# Patient Record
Sex: FEMALE | Race: BLACK OR AFRICAN AMERICAN | NOT HISPANIC OR LATINO
[De-identification: names, ages, dates, MRNs, and addresses within clinical notes are randomized per-mention and may not be internally consistent; named-entity substitution may affect disease eponyms.]

---

## 2022-06-13 ENCOUNTER — APPOINTMENT (OUTPATIENT)
Dept: ORTHOPEDIC SURGERY | Facility: CLINIC | Age: 67
End: 2022-06-13
Payer: COMMERCIAL

## 2022-06-13 VITALS — BODY MASS INDEX: 25.33 KG/M2 | WEIGHT: 152 LBS | HEIGHT: 65 IN

## 2022-06-13 PROCEDURE — 99203 OFFICE O/P NEW LOW 30 MIN: CPT

## 2022-06-13 NOTE — ASSESSMENT
[FreeTextEntry1] : I DISCUSSED TREATMENT OPTIONS WHICH IN MY OPINION NEEDS TO BE REMOVED SURGICALLY.\par I WILL ORDER AN MRI TO EVALUATE PREOPERATIVELY TO PLAN FOR SURGERY.

## 2022-06-13 NOTE — PHYSICAL EXAM
[Bilateral] : foot and ankle bilaterally [NL (20)] : eversion 20 degrees [NL (40)] : MTP joint DF 40 degrees [5___] : [facility] 5[unfilled]/5 [2+] : posterior tibialis pulse: 2+ [] : varicosities are not warm and well perfused [FreeTextEntry3] : FIBROMAS NOTED MEDIAL PLANTAR FASCIA RIGHT FOOT AND MIDDLE BAND LEFT FOOT.  PAIN IN RIGHT ONLY.   [de-identified] : PAIN ALONG PLANTAR FASCIITIS.\par

## 2022-06-13 NOTE — REASON FOR VISIT
[FreeTextEntry2] : Patient is here for second evaluation for her bilateral feet plantar fibroma. Patient was seen by Dr Aquino who tried injections to shrink it but it seems it did not work so she is here for second opinion. STEROID INJECTIONS OVER THE LAST THREE YEARS HAVE NOT DONE ANYTHING.  STEROID INJECTION EVERY TWO MONTHS.

## 2022-06-14 ENCOUNTER — TRANSCRIPTION ENCOUNTER (OUTPATIENT)
Age: 67
End: 2022-06-14

## 2022-07-12 ENCOUNTER — APPOINTMENT (OUTPATIENT)
Dept: PODIATRY | Facility: CLINIC | Age: 67
End: 2022-07-12

## 2022-07-12 PROCEDURE — 99214 OFFICE O/P EST MOD 30 MIN: CPT

## 2022-07-12 NOTE — PHYSICAL EXAM
[Right] : right foot and ankle [NL (40)] : plantar flexion 40 degrees [NL 30)] : inversion 30 degrees [NL (20)] : eversion 20 degrees [5___] : [facility] 5[unfilled]/5 [2+] : posterior tibialis pulse: 2+ [] : varicosities are not warm and well perfused [FreeTextEntry3] : Plantar fibromas right foot -multiple type according to MRI

## 2022-07-12 NOTE — DATA REVIEWED
[MRI] : MRI [Right] : of the right [Foot] : foot [Report was reviewed and noted in the chart] : The report was reviewed and noted in the chart [I reviewed the films/CD and agree] : I reviewed the films/CD and agree [FreeTextEntry1] : EXAM: MRI FOOT RT WO CONT\par \par CLINICAL HISTORY: 66 years old. PLANTAR FASCIAL FIBROMATOSIS OF BOTH FEET;\par SMR.\par \par TECHNIQUE: Multiplanar, multisequence MR images of the right forefoot and\par midfoot was performed without intravenous contrast.\par \par Technologist Comments: Fibromatosis on plantar aspect of Right foot, marked\par with Vitamin E tab..\par \par Contrast: None\par \par COMPARISON: No relevant prior exam.\par \par FINDINGS:\par \par Bones: No acute osseous abnormality. Small subchondral cyst at the lateral\par aspect of the cuboid at the cuboid-5th metatarsal joint representing\par degenerative change.\par \par Fluid/effusion: No sizable joint effusion. There is a ganglion cyst along\par the dorsal aspect of the talar neck measuring approximately 1.8 x 2.1 x 1.8\par cm in size, possibly sequela of prior extensor retinaculum sprain injury.\par \par Flexor tendons: Intact.\par \par Extensor tendons: Intact.\par \par Achilles tendon: Intact. Trace fluid within the retrocalcaneal bursa.\par \par Sinus tarsi: Unremarkable.\par \par Muscles: No selective muscle edema or disproportionate atrophy. No acute\par muscle injury.\par \par Soft tissues: There is a large soft tissue mass measuring 1.7 x 0.8 x 5.9\par cm in size associated with the plantar fascia spanning from the mid foot 2\par mid metatarsal shaft (series 7 image 17-21), consistent with a large plantar\par fibroma.\par \par Lisfranc ligament: Intact.\par \par Other: None.\par \par \par IMPRESSION:\par \par Large plantar fibroma measuring 1.7 x 0.8 x 5.9 cm.\par \par A ganglion cyst along the dorsal aspect of the talar neck measuring\par approximately 1.8 x 2.1 x 1.8 cm in size, possibly sequela of prior extensor\par retinaculum sprain injury.\par \par Mild degenerative arthrosis of the cuboid-fifth metatarsal joint.\par EXAM: MRI FOOT RT WO CONT\par \par CLINICAL HISTORY: 66 years old. PLANTAR FASCIAL FIBROMATOSIS OF BOTH FEET;\par SMR.\par \par TECHNIQUE: Multiplanar, multisequence MR images of the right forefoot and\par midfoot was performed without intravenous contrast.\par \par Technologist Comments: Fibromatosis on plantar aspect of Right foot, marked\par with Vitamin E tab..\par \par Contrast: None\par \par COMPARISON: No relevant prior exam.\par \par FINDINGS:\par \par Bones: No acute osseous abnormality. Small subchondral cyst at the lateral\par aspect of the cuboid at the cuboid-5th metatarsal joint representing\par degenerative change.\par \par Fluid/effusion: No sizable joint effusion. There is a ganglion cyst along\par the dorsal aspect of the talar neck measuring approximately 1.8 x 2.1 x 1.8\par cm in size, possibly sequela of prior extensor retinaculum sprain injury.\par \par Flexor tendons: Intact.\par \par Extensor tendons: Intact.\par \par Achilles tendon: Intact. Trace fluid within the retrocalcaneal bursa.\par \par Sinus tarsi: Unremarkable.\par \par Muscles: No selective muscle edema or disproportionate atrophy. No acute\par muscle injury.\par \par Soft tissues: There is a large soft tissue mass measuring 1.7 x 0.8 x 5.9\par cm in size associated with the plantar fascia spanning from the mid foot 2\par mid metatarsal shaft (series 7 image 17-21), consistent with a large plantar\par fibroma.\par \par Lisfranc ligament: Intact.\par \par Other: None.\par \par \par IMPRESSION:\par \par Large plantar fibroma measuring 1.7 x 0.8 x 5.9 cm.\par \par A ganglion cyst along the dorsal aspect of the talar neck measuring\par approximately 1.8 x 2.1 x 1.8 cm in size, possibly sequela of prior extensor\par retinaculum sprain injury.\par \par Mild degenerative arthrosis of the cuboid-fifth metatarsal joint.\par EXAM: MRI FOOT RT WO CONT\par \par CLINICAL HISTORY: 66 years old. PLANTAR FASCIAL FIBROMATOSIS OF BOTH FEET;\par SMR.\par \par TECHNIQUE: Multiplanar, multisequence MR images of the right forefoot and\par midfoot was performed without intravenous contrast.\par \par Technologist Comments: Fibromatosis on plantar aspect of Right foot, marked\par with Vitamin E tab..\par \par Contrast: None\par \par COMPARISON: No relevant prior exam.\par \par FINDINGS:\par \par Bones: No acute osseous abnormality. Small subchondral cyst at the lateral\par aspect of the cuboid at the cuboid-5th metatarsal joint representing\par degenerative change.\par \par Fluid/effusion: No sizable joint effusion. There is a ganglion cyst along\par the dorsal aspect of the talar neck measuring approximately 1.8 x 2.1 x 1.8\par cm in size, possibly sequela of prior extensor retinaculum sprain injury.\par \par Flexor tendons: Intact.\par \par Extensor tendons: Intact.\par \par Achilles tendon: Intact. Trace fluid within the retrocalcaneal bursa.\par \par Sinus tarsi: Unremarkable.\par \par Muscles: No selective muscle edema or disproportionate atrophy. No acute\par muscle injury.\par \par Soft tissues: There is a large soft tissue mass measuring 1.7 x 0.8 x 5.9\par cm in size associated with the plantar fascia spanning from the mid foot 2\par mid metatarsal shaft (series 7 image 17-21), consistent with a large plantar\par fibroma.\par \par Lisfranc ligament: Intact.\par \par Other: None.\par \par \par IMPRESSION:\par \par Large plantar fibroma measuring 1.7 x 0.8 x 5.9 cm.\par \par A ganglion cyst along the dorsal aspect of the talar neck measuring\par approximately 1.8 x 2.1 x 1.8 cm in size, possibly sequela of prior extensor\par retinaculum sprain injury.\par \par Mild degenerative arthrosis of the cuboid-fifth metatarsal joint.\par

## 2022-07-12 NOTE — ASSESSMENT
[FreeTextEntry1] : i will schedule her for a removal of the fibromas on October 13th 2022 at [facility].  Outpatient.\par Allergy-sulfa.\par No blood thiners\par No smoke\par No guarantee as to a result or cure. \par

## 2022-07-12 NOTE — REASON FOR VISIT
[FreeTextEntry2] : Patient is here for second evaluation for her bilateral feet FOLLOW UP MRI RESULTS

## 2022-07-20 ENCOUNTER — FORM ENCOUNTER (OUTPATIENT)
Age: 67
End: 2022-07-20

## 2022-07-25 ENCOUNTER — FORM ENCOUNTER (OUTPATIENT)
Age: 67
End: 2022-07-25

## 2022-09-16 ENCOUNTER — APPOINTMENT (OUTPATIENT)
Dept: PODIATRY | Facility: CLINIC | Age: 67
End: 2022-09-16

## 2022-09-16 PROCEDURE — L3260: CPT | Mod: RT

## 2022-09-16 PROCEDURE — 99213 OFFICE O/P EST LOW 20 MIN: CPT

## 2022-09-21 ENCOUNTER — NON-APPOINTMENT (OUTPATIENT)
Age: 67
End: 2022-09-21

## 2022-09-21 ENCOUNTER — APPOINTMENT (OUTPATIENT)
Dept: CARDIOLOGY | Facility: CLINIC | Age: 67
End: 2022-09-21

## 2022-09-21 VITALS
OXYGEN SATURATION: 98 % | HEART RATE: 71 BPM | BODY MASS INDEX: 26.46 KG/M2 | WEIGHT: 155 LBS | SYSTOLIC BLOOD PRESSURE: 120 MMHG | DIASTOLIC BLOOD PRESSURE: 72 MMHG | HEIGHT: 64 IN

## 2022-09-21 VITALS — SYSTOLIC BLOOD PRESSURE: 118 MMHG | DIASTOLIC BLOOD PRESSURE: 78 MMHG

## 2022-09-21 DIAGNOSIS — Z78.9 OTHER SPECIFIED HEALTH STATUS: ICD-10-CM

## 2022-09-21 DIAGNOSIS — Z82.79 FAMILY HISTORY OF OTHER CONGENITAL MALFORMATIONS, DEFORMATIONS AND CHROMOSOMAL ABNORMALITIES: ICD-10-CM

## 2022-09-21 DIAGNOSIS — Z13.6 ENCOUNTER FOR SCREENING FOR CARDIOVASCULAR DISORDERS: ICD-10-CM

## 2022-09-21 DIAGNOSIS — Z83.3 FAMILY HISTORY OF DIABETES MELLITUS: ICD-10-CM

## 2022-09-21 DIAGNOSIS — Z87.891 PERSONAL HISTORY OF NICOTINE DEPENDENCE: ICD-10-CM

## 2022-09-21 DIAGNOSIS — Z00.00 ENCOUNTER FOR GENERAL ADULT MEDICAL EXAMINATION W/OUT ABNORMAL FINDINGS: ICD-10-CM

## 2022-09-21 PROCEDURE — 99204 OFFICE O/P NEW MOD 45 MIN: CPT | Mod: 25

## 2022-09-21 PROCEDURE — 93000 ELECTROCARDIOGRAM COMPLETE: CPT | Mod: 59

## 2022-09-21 PROCEDURE — 93242 EXT ECG>48HR<7D RECORDING: CPT | Mod: 59

## 2022-09-21 NOTE — PHYSICAL EXAM
[Well Developed] : well developed [Well Nourished] : well nourished [No Acute Distress] : no acute distress [Normal Venous Pressure] : normal venous pressure [No Carotid Bruit] : no carotid bruit [Normal S1, S2] : normal S1, S2 [No Rub] : no rub [No Gallop] : no gallop [Clear Lung Fields] : clear lung fields [Good Air Entry] : good air entry [No Respiratory Distress] : no respiratory distress  [Normal Gait] : normal gait [No Edema] : no edema [No Cyanosis] : no cyanosis [No Clubbing] : no clubbing [No Varicosities] : no varicosities [Normal Radial B/L] : normal radial B/L [Normal DP B/L] : normal DP B/L [Moves all extremities] : moves all extremities [Normal Speech] : normal speech [Alert and Oriented] : alert and oriented [de-identified] : Midsystolic murmur

## 2022-09-21 NOTE — ASSESSMENT
[FreeTextEntry1] : Reviewed on September 21, 2022\par EKG as noted above\par Labs from September 8, 2022 sodium 142 potassium 4.4 creatinine 0.44 CBC was stable.  TSH was 1.8

## 2022-09-21 NOTE — REASON FOR VISIT
[Symptom and Test Evaluation] : symptom and test evaluation [Arrhythmia/ECG Abnorrmalities] : arrhythmia/ECG abnormalities [FreeTextEntry3] : Dr. Ruiz [FreeTextEntry1] : 67-year-old female is referred to me for consultation for evaluation for cardiovascular risk in presence of her age and irregularity of the pulse.\par She has no complaint of chest pain at present.  No significant shortness of breath PND orthopnea palpitation dizziness lightheadedness near syncopal or syncopal event.\par No claudication pain\par No history of hypertension, diabetes mellitus, myocardial infarction, peripheral artery disease, rheumatic fever, thyroid or Lyme disease.\par She is non-smoker.  No significant alcohol intake.  No increased caffeine intake

## 2022-09-30 ENCOUNTER — APPOINTMENT (OUTPATIENT)
Dept: CARDIOLOGY | Facility: CLINIC | Age: 67
End: 2022-09-30

## 2022-09-30 PROCEDURE — 93244 EXT ECG>48HR<7D REV&INTERPJ: CPT

## 2022-10-05 ENCOUNTER — APPOINTMENT (OUTPATIENT)
Dept: CARDIOLOGY | Facility: CLINIC | Age: 67
End: 2022-10-05

## 2022-10-05 VITALS
TEMPERATURE: 97.8 F | SYSTOLIC BLOOD PRESSURE: 104 MMHG | OXYGEN SATURATION: 98 % | BODY MASS INDEX: 26.43 KG/M2 | HEART RATE: 77 BPM | DIASTOLIC BLOOD PRESSURE: 70 MMHG | WEIGHT: 154 LBS

## 2022-10-05 DIAGNOSIS — R94.31 ABNORMAL ELECTROCARDIOGRAM [ECG] [EKG]: ICD-10-CM

## 2022-10-05 DIAGNOSIS — I49.1 ATRIAL PREMATURE DEPOLARIZATION: ICD-10-CM

## 2022-10-05 PROCEDURE — 93306 TTE W/DOPPLER COMPLETE: CPT

## 2022-10-05 PROCEDURE — 99214 OFFICE O/P EST MOD 30 MIN: CPT

## 2022-10-05 NOTE — DISCUSSION/SUMMARY
[FreeTextEntry1] : DILIA KAYE  is a 67 year F  who presents today Oct 05, 2022 with the above history and the following active issues. \par \par PAC's. NEGRO reviewed and burder is 3.3%. Patient is asymptomatic from the extra beats. Recommend she continue to monitor. Avoid excessive amounts of caffeine. Continue to remain active. Stay well hydrated. \par \par Murmur - echo reviewed. Normal LVEF. Minimal valulopathy. Does not require SBE prophylaxis. \par \par Preventive care.  Fasting lipid panel requested. Low cholesterol diet. Lifestyle and risk factor modification.\par \par Stable from cardiac point of view for her foot procedure.\par \par Red flag symptoms which would warrant sooner emergent evaluation reviewed with the patient. \par Questions and concerns were addressed and answered. \par \par Sincerely,\par \par Ruth Puckett PA-C\par Patients history, testing and plan reviewed with supervising MD: Dr. Tamika Ruiz

## 2022-10-05 NOTE — PHYSICAL EXAM
[Well Developed] : well developed [Well Nourished] : well nourished [No Acute Distress] : no acute distress [Normal Venous Pressure] : normal venous pressure [No Carotid Bruit] : no carotid bruit [Normal S1, S2] : normal S1, S2 [No Rub] : no rub [No Gallop] : no gallop [Clear Lung Fields] : clear lung fields [Good Air Entry] : good air entry [No Respiratory Distress] : no respiratory distress  [Normal Gait] : normal gait [No Edema] : no edema [No Cyanosis] : no cyanosis [No Clubbing] : no clubbing [No Varicosities] : no varicosities [Normal Radial B/L] : normal radial B/L [Normal DP B/L] : normal DP B/L [Moves all extremities] : moves all extremities [Normal Speech] : normal speech [Alert and Oriented] : alert and oriented [de-identified] : Midsystolic murmur

## 2022-10-05 NOTE — REASON FOR VISIT
[Symptom and Test Evaluation] : symptom and test evaluation [Arrhythmia/ECG Abnorrmalities] : arrhythmia/ECG abnormalities [FreeTextEntry3] : Dr. Ruiz [FreeTextEntry1] : DILIA KAYE  is a 67 year F  who presents today Oct 05, 2022 for echo and to review testing.  Overall she has been feeling well. There has been no recent illness or hospital stay. Medications have remained unchanged. Asymptomatic from cardiovascular and arrhythmia standpoint. \par She is active with no new exertional complaints. \par \par Today she denies chest pain, pressure, unusual shortness of breath, lightheadedness, dizziness, near syncope or syncope. \par \par \par 67-year-old female is referred to me for consultation for evaluation for cardiovascular risk in presence of her age and irregularity of the pulse.\par No history of hypertension, diabetes mellitus, myocardial infarction, peripheral artery disease, rheumatic fever, thyroid or Lyme disease.\par She is non-smoker.  No significant alcohol intake.  No increased caffeine intake

## 2022-10-05 NOTE — CARDIOLOGY SUMMARY
[de-identified] : September 21, 2022.  Normal sinus rhythm frequent PACs [de-identified] : BIRDIE TOM 10/21/22 - SR with avg HR in 80's with rare ectopy [de-identified] : Echo 10/5/2022 - EF 60-65%, min MR

## 2022-10-10 ENCOUNTER — FORM ENCOUNTER (OUTPATIENT)
Age: 67
End: 2022-10-10

## 2022-10-11 RX ORDER — KETOROLAC TROMETHAMINE 10 MG/1
10 TABLET, FILM COATED ORAL EVERY 6 HOURS
Qty: 20 | Refills: 0 | Status: ACTIVE | COMMUNITY
Start: 2022-10-11 | End: 1900-01-01

## 2022-10-11 RX ORDER — HYDROCODONE BITARTRATE AND ACETAMINOPHEN 5; 325 MG/1; MG/1
5-325 TABLET ORAL
Qty: 42 | Refills: 0 | Status: ACTIVE | COMMUNITY
Start: 2022-10-11 | End: 1900-01-01

## 2022-10-13 ENCOUNTER — FORM ENCOUNTER (OUTPATIENT)
Age: 67
End: 2022-10-13

## 2022-10-13 ENCOUNTER — APPOINTMENT (OUTPATIENT)
Dept: ORTHOPEDIC SURGERY | Facility: AMBULATORY MEDICAL SERVICES | Age: 67
End: 2022-10-13

## 2022-10-13 PROCEDURE — 28060 PARTIAL REMOVAL FOOT FASCIA: CPT | Mod: RT

## 2022-10-16 ENCOUNTER — FORM ENCOUNTER (OUTPATIENT)
Age: 67
End: 2022-10-16

## 2022-10-24 ENCOUNTER — APPOINTMENT (OUTPATIENT)
Dept: PODIATRY | Facility: CLINIC | Age: 67
End: 2022-10-24

## 2022-10-24 PROCEDURE — 99024 POSTOP FOLLOW-UP VISIT: CPT

## 2022-10-24 NOTE — ASSESSMENT
[FreeTextEntry1] : [employer] APPLIED\par PATIENT CAN REMOVE DRESSINGS ON THURSDAY AND [employer]HE FOOT.  SHE WILL APPLY BANDAGE AND CONTINUE WITH CRUTCHES. \par RTO 2 WEEKS

## 2022-11-07 ENCOUNTER — APPOINTMENT (OUTPATIENT)
Dept: PODIATRY | Facility: CLINIC | Age: 67
End: 2022-11-07

## 2022-11-07 PROCEDURE — 99024 POSTOP FOLLOW-UP VISIT: CPT

## 2022-11-07 NOTE — ASSESSMENT
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

## 2022-12-05 ENCOUNTER — APPOINTMENT (OUTPATIENT)
Dept: ORTHOPEDIC SURGERY | Facility: CLINIC | Age: 67
End: 2022-12-05

## 2022-12-05 DIAGNOSIS — M72.2 PLANTAR FASCIAL FIBROMATOSIS: ICD-10-CM

## 2022-12-05 PROCEDURE — 99024 POSTOP FOLLOW-UP VISIT: CPT

## 2022-12-05 NOTE — REASON FOR VISIT
[FreeTextEntry2] : Patient is presents for right foot follow up (sx 10/13/2022) states she has some discomfort but is taking aspirin as needed and presents in post op shoe

## 2023-01-31 ENCOUNTER — OFFICE (OUTPATIENT)
Dept: URBAN - METROPOLITAN AREA CLINIC 8 | Facility: CLINIC | Age: 68
Setting detail: OPHTHALMOLOGY
End: 2023-01-31
Payer: COMMERCIAL

## 2023-01-31 DIAGNOSIS — H35.411: ICD-10-CM

## 2023-01-31 DIAGNOSIS — H43.811: ICD-10-CM

## 2023-01-31 PROCEDURE — 92004 COMPRE OPH EXAM NEW PT 1/>: CPT | Performed by: OPHTHALMOLOGY

## 2023-01-31 ASSESSMENT — CONFRONTATIONAL VISUAL FIELD TEST (CVF)
OS_FINDINGS: FULL
OD_FINDINGS: FULL

## 2023-02-14 ENCOUNTER — OFFICE (OUTPATIENT)
Dept: URBAN - METROPOLITAN AREA CLINIC 8 | Facility: CLINIC | Age: 68
Setting detail: OPHTHALMOLOGY
End: 2023-02-14
Payer: COMMERCIAL

## 2023-02-14 DIAGNOSIS — H35.411: ICD-10-CM

## 2023-02-14 DIAGNOSIS — H43.811: ICD-10-CM

## 2023-02-14 PROCEDURE — 99213 OFFICE O/P EST LOW 20 MIN: CPT | Performed by: OPHTHALMOLOGY

## 2023-02-14 ASSESSMENT — KERATOMETRY
OD_AXISANGLE_DEGREES: 086
OD_K1POWER_DIOPTERS: 40.75
OS_K1POWER_DIOPTERS: 41.00
OD_K2POWER_DIOPTERS: 41.75
OS_K2POWER_DIOPTERS: 42.00
OS_AXISANGLE_DEGREES: 076

## 2023-02-14 ASSESSMENT — SPHEQUIV_DERIVED
OS_SPHEQUIV: -1.375
OD_SPHEQUIV: -1.125

## 2023-02-14 ASSESSMENT — REFRACTION_AUTOREFRACTION
OS_AXIS: 168
OS_CYLINDER: -0.25
OS_SPHERE: -1.25
OD_AXIS: 174
OD_SPHERE: -0.75
OD_CYLINDER: -0.75

## 2023-02-14 ASSESSMENT — VISUAL ACUITY
OS_BCVA: 20/25-1
OD_BCVA: 20/25

## 2023-02-14 ASSESSMENT — AXIALLENGTH_DERIVED
OS_AL: 24.9345
OD_AL: 24.9283

## 2023-02-14 ASSESSMENT — CONFRONTATIONAL VISUAL FIELD TEST (CVF)
OD_FINDINGS: FULL
OS_FINDINGS: FULL

## 2025-03-11 ENCOUNTER — APPOINTMENT (OUTPATIENT)
Dept: ORTHOPEDIC SURGERY | Facility: CLINIC | Age: 70
End: 2025-03-11
Payer: COMMERCIAL

## 2025-03-11 DIAGNOSIS — M48.02 SPINAL STENOSIS, CERVICAL REGION: ICD-10-CM

## 2025-03-11 PROCEDURE — 99204 OFFICE O/P NEW MOD 45 MIN: CPT | Mod: 25

## 2025-03-11 PROCEDURE — 72040 X-RAY EXAM NECK SPINE 2-3 VW: CPT

## 2025-03-11 RX ORDER — METHYLPREDNISOLONE 4 MG/1
4 TABLET ORAL
Qty: 1 | Refills: 0 | Status: ACTIVE | COMMUNITY
Start: 2025-03-11 | End: 1900-01-01

## 2025-03-11 RX ORDER — HYDROCHLOROTHIAZIDE 12.5 MG/1
TABLET ORAL
Refills: 0 | Status: ACTIVE | COMMUNITY

## 2025-03-11 RX ORDER — ROSUVASTATIN CALCIUM 5 MG/1
TABLET, FILM COATED ORAL
Refills: 0 | Status: ACTIVE | COMMUNITY

## 2025-03-11 RX ORDER — METFORMIN HYDROCHLORIDE 750 MG/1
TABLET ORAL
Refills: 0 | Status: ACTIVE | COMMUNITY

## 2025-03-31 ENCOUNTER — APPOINTMENT (OUTPATIENT)
Dept: ORTHOPEDIC SURGERY | Facility: CLINIC | Age: 70
End: 2025-03-31

## 2025-03-31 VITALS — WEIGHT: 150 LBS | HEIGHT: 64 IN | BODY MASS INDEX: 25.61 KG/M2

## 2025-03-31 DIAGNOSIS — M47.22 OTHER SPONDYLOSIS WITH RADICULOPATHY, CERVICAL REGION: ICD-10-CM

## 2025-03-31 DIAGNOSIS — E78.00 PURE HYPERCHOLESTEROLEMIA, UNSPECIFIED: ICD-10-CM

## 2025-03-31 DIAGNOSIS — E11.9 TYPE 2 DIABETES MELLITUS W/OUT COMPLICATIONS: ICD-10-CM

## 2025-03-31 DIAGNOSIS — M48.12 ANKYLOSING HYPEROSTOSIS [FORESTIER], CERVICAL REGION: ICD-10-CM

## 2025-03-31 DIAGNOSIS — J45.909 UNSPECIFIED ASTHMA, UNCOMPLICATED: ICD-10-CM

## 2025-03-31 DIAGNOSIS — M75.52 BURSITIS OF LEFT SHOULDER: ICD-10-CM

## 2025-03-31 PROCEDURE — 99213 OFFICE O/P EST LOW 20 MIN: CPT | Mod: 25

## 2025-03-31 PROCEDURE — 73030 X-RAY EXAM OF SHOULDER: CPT | Mod: LT

## 2025-03-31 RX ORDER — MECLIZINE HYDROCHLORIDE 25 MG/1
25 TABLET ORAL
Refills: 0 | Status: ACTIVE | COMMUNITY

## 2025-05-12 ENCOUNTER — APPOINTMENT (OUTPATIENT)
Dept: ORTHOPEDIC SURGERY | Facility: CLINIC | Age: 70
End: 2025-05-12
Payer: COMMERCIAL

## 2025-05-12 DIAGNOSIS — M48.12 ANKYLOSING HYPEROSTOSIS [FORESTIER], CERVICAL REGION: ICD-10-CM

## 2025-05-12 DIAGNOSIS — M48.02 SPINAL STENOSIS, CERVICAL REGION: ICD-10-CM

## 2025-05-12 DIAGNOSIS — M75.52 BURSITIS OF LEFT SHOULDER: ICD-10-CM

## 2025-05-12 DIAGNOSIS — M47.22 OTHER SPONDYLOSIS WITH RADICULOPATHY, CERVICAL REGION: ICD-10-CM

## 2025-05-12 PROCEDURE — 20610 DRAIN/INJ JOINT/BURSA W/O US: CPT | Mod: LT

## 2025-05-12 PROCEDURE — 99214 OFFICE O/P EST MOD 30 MIN: CPT | Mod: 25

## 2025-08-04 ENCOUNTER — APPOINTMENT (OUTPATIENT)
Dept: ORTHOPEDIC SURGERY | Facility: CLINIC | Age: 70
End: 2025-08-04
Payer: COMMERCIAL

## 2025-08-04 DIAGNOSIS — M75.52 BURSITIS OF LEFT SHOULDER: ICD-10-CM

## 2025-08-04 DIAGNOSIS — M47.22 OTHER SPONDYLOSIS WITH RADICULOPATHY, CERVICAL REGION: ICD-10-CM

## 2025-08-04 DIAGNOSIS — M48.12 ANKYLOSING HYPEROSTOSIS [FORESTIER], CERVICAL REGION: ICD-10-CM

## 2025-08-04 DIAGNOSIS — M48.02 SPINAL STENOSIS, CERVICAL REGION: ICD-10-CM

## 2025-08-04 PROCEDURE — 20610 DRAIN/INJ JOINT/BURSA W/O US: CPT | Mod: LT

## 2025-08-04 PROCEDURE — 99213 OFFICE O/P EST LOW 20 MIN: CPT | Mod: 25
